# Patient Record
Sex: FEMALE | Race: WHITE | NOT HISPANIC OR LATINO
[De-identification: names, ages, dates, MRNs, and addresses within clinical notes are randomized per-mention and may not be internally consistent; named-entity substitution may affect disease eponyms.]

---

## 2019-05-06 ENCOUNTER — FORM ENCOUNTER (OUTPATIENT)
Age: 69
End: 2019-05-06

## 2020-01-16 ENCOUNTER — FORM ENCOUNTER (OUTPATIENT)
Age: 70
End: 2020-01-16

## 2020-05-10 ENCOUNTER — FORM ENCOUNTER (OUTPATIENT)
Age: 70
End: 2020-05-10

## 2020-07-23 ENCOUNTER — FORM ENCOUNTER (OUTPATIENT)
Age: 70
End: 2020-07-23

## 2020-11-24 ENCOUNTER — FORM ENCOUNTER (OUTPATIENT)
Age: 70
End: 2020-11-24

## 2021-02-09 PROBLEM — Z00.00 ENCOUNTER FOR PREVENTIVE HEALTH EXAMINATION: Status: ACTIVE | Noted: 2021-02-09

## 2021-02-10 ENCOUNTER — APPOINTMENT (OUTPATIENT)
Dept: BREAST CENTER | Facility: CLINIC | Age: 71
End: 2021-02-10
Payer: MEDICARE

## 2021-02-10 ENCOUNTER — TRANSCRIPTION ENCOUNTER (OUTPATIENT)
Age: 71
End: 2021-02-10

## 2021-02-10 VITALS — HEIGHT: 71 IN | WEIGHT: 193 LBS | BODY MASS INDEX: 27.02 KG/M2

## 2021-02-10 DIAGNOSIS — Z86.39 PERSONAL HISTORY OF OTHER ENDOCRINE, NUTRITIONAL AND METABOLIC DISEASE: ICD-10-CM

## 2021-02-10 DIAGNOSIS — Z80.3 FAMILY HISTORY OF MALIGNANT NEOPLASM OF BREAST: ICD-10-CM

## 2021-02-10 DIAGNOSIS — C50.411 MALIGNANT NEOPLASM OF UPPER-OUTER QUADRANT OF RIGHT FEMALE BREAST: ICD-10-CM

## 2021-02-10 DIAGNOSIS — Z78.9 OTHER SPECIFIED HEALTH STATUS: ICD-10-CM

## 2021-02-10 DIAGNOSIS — R59.0 LOCALIZED ENLARGED LYMPH NODES: ICD-10-CM

## 2021-02-10 PROCEDURE — 99214 OFFICE O/P EST MOD 30 MIN: CPT

## 2021-02-10 RX ORDER — ANASTROZOLE 1 MG/1
1 TABLET ORAL
Refills: 0 | Status: ACTIVE | COMMUNITY

## 2021-02-10 RX ORDER — METFORMIN HYDROCHLORIDE 500 MG/1
500 TABLET, COATED ORAL
Refills: 0 | Status: ACTIVE | COMMUNITY

## 2021-02-10 NOTE — REASON FOR VISIT
[Follow-Up: _____] : a [unfilled] follow-up visit [FreeTextEntry1] : Patient comes in now with a history of undergoing a right breast upper outer quadrant partial mastectomy with axillary lymph node dissection performed by Dr. Gonzalez on June 11, 2018.  She had 12 out of 21 positive lymph nodes and had an 8 mm invasive lobular cancer which was ER/RI positive HER-2/joey negative making this a pathological prognostic stage IIIA breast cancer.  She underwent chemotherapy with Dr. Hays and external beam radiation with Dr. Wright and remains on Arimidex.

## 2021-02-10 NOTE — HISTORY OF PRESENT ILLNESS
[FreeTextEntry1] : The patient is a 70-year-old G3, P2 postmenopausal white female of [place] descent.  She took combination hormone replacement for about 10 years after menopause.  She underwent menarche at age 16 and had her first child at age 22.  She has a family history with her mother who had breast cancer at age 50.  The patient underwent a screening mammography and ultrasound in May 2018 and was found to have a 1.1 cm nodule in the right breast 11:00 region 4 cm from the nipple and ultrasound-guided core biopsy showed an invasive lobular cancer which was ER/OH positive HER-2/joey negative with a Ki-67 of 10%.  She underwent a right breast partial mastectomy and sentinel lymph node biopsy by Dr. Gonzalez on June 11, 2018.  Final pathology showed an 8 mm invasive lobular cancer with negative margins but she had 8 out of 9 positive sentinel lymph nodes and underwent an axillary lymph node dissection on June 25, 2018 showing another 4 out of 12 positive nodes making a total of 12 out of 20 1positive lymph nodes with extranodal extension making this a stage IIIA pathologic prognostic breast cancer.  She underwent chemotherapy through Dr. Hays and underwent external beam radiation through Dr. Wright.  She is currently on Arimidex and comes in for routine follow-up.

## 2021-02-10 NOTE — PHYSICAL EXAM
[Normocephalic] : normocephalic [Atraumatic] : atraumatic [EOMI] : extra ocular movement intact [Supple] : supple [No Supraclavicular Adenopathy] : no supraclavicular adenopathy [No Cervical Adenopathy] : no cervical adenopathy [Normal Sinus Rhythm] : normal sinus rhythm [Examined in the supine and seated position] : examined in the supine and seated position [No dominant masses] : no dominant masses in right breast  [No dominant masses] : no dominant masses left breast [No Nipple Retraction] : no left nipple retraction [No Nipple Discharge] : no left nipple discharge [Breast Mass Right Breast ___cm] : no masses [Breast Mass Left Breast ___cm] : no masses [Breast Nipple Inversion] : nipples not inverted [Breast Nipple Retraction] : nipples not retracted [Breast Nipple Flattening] : nipples not flattened [Breast Nipple Fissures] : nipples not fissured [No Axillary Lymphadenopathy] : no left axillary lymphadenopathy [Soft] : abdomen soft [Normal Bowel Sounds] : normal bowel sounds  [No Edema] : no edema [No Rashes] : no rashes [No Ulceration] : no ulceration [de-identified] : The patient has the obvious partial mastectomy with scarring changes in the upper outer aspect of the right breast.  She has no evidence of recurrence and her left breast is negative.  She has no axillary, supraclavicular, or cervical adenopathy. [de-identified] : Scarring changes in the right upper outer quadrant and axillary region from her prior partial mastectomy and lymph node dissection

## 2021-02-10 NOTE — ASSESSMENT
[FreeTextEntry1] : The patient is a 70-year-old G3, P2 postmenopausal white female of [place] descent.  She took combination hormone replacement for about 10 years after menopause.  She underwent menarche at age 16 and had her first child at age 22.  She has a family history with her mother who had breast cancer at age 50.  The patient underwent a screening mammography and ultrasound in May 2018 and was found to have a 1.1 cm nodule in the right breast 11:00 region 4 cm from the nipple and ultrasound-guided core biopsy showed an invasive lobular cancer which was ER/AR positive HER-2/joey negative with a Ki-67 of 10%.  She underwent a right breast partial mastectomy and sentinel lymph node biopsy by Dr. Gonzalez on June 11, 2018.  Final pathology showed an 8 mm invasive lobular cancer with negative margins but she had 8 out of 9 positive sentinel lymph nodes and underwent an axillary lymph node dissection on June 25, 2018 showing another 4 out of 12 positive nodes making a total of 12 out of 20 1 positive lymph nodes with extranodal extension making this a stage IIIA pathologic prognostic breast cancer.  She underwent chemotherapy through Dr. Hays and underwent external beam radiation through Dr. Wirght.  On exam today she is scarring changes in the upper outer aspect of the right breast but no evidence of recurrence.  She underwent her last bilateral mammography and ultrasound on May 11, 2020 just showing the postop changes in the right breast.  She insisted on an interval ultrasound which was performed on November 25, 2020 which showed no suspicious findings and she has stable scarring.  She should continue 6-month follow-up and we will see her again around August 2021 and she will be due for her next bilateral mammography and ultrasound in May 2021.  She now follows up with Dr. Jordan and remains on Arimidex.

## 2021-09-30 DIAGNOSIS — R92.2 INCONCLUSIVE MAMMOGRAM: ICD-10-CM

## 2021-10-05 NOTE — PAST MEDICAL HISTORY
[Postmenopausal] : The patient is postmenopausal [Menarche Age ____] : age at menarche was [unfilled] [History of Hormone Replacement Treatment] : has a history of hormone replacement treatment [Total Preg ___] : G[unfilled] [Live Births ___] : P[unfilled]  [Age At Live Birth ___] : Age at live birth: [unfilled]

## 2021-10-06 ENCOUNTER — APPOINTMENT (OUTPATIENT)
Dept: BREAST CENTER | Facility: CLINIC | Age: 71
End: 2021-10-06
Payer: MEDICARE

## 2021-10-06 DIAGNOSIS — N64.4 MASTODYNIA: ICD-10-CM

## 2021-10-06 PROCEDURE — 99213 OFFICE O/P EST LOW 20 MIN: CPT

## 2021-10-06 RX ORDER — ANASTROZOLE TABLETS 1 MG/1
1 TABLET ORAL DAILY
Qty: 90 | Refills: 0 | Status: ACTIVE | COMMUNITY
Start: 2021-10-06 | End: 1900-01-01

## 2021-10-06 NOTE — ASSESSMENT
[FreeTextEntry1] : The patient is a 71-year-old G3, P2 postmenopausal white female of [place] descent.  She underwent menarche at age 16 and had her first child at age 22.  She took combination hormone replacement therapy for about 10 years after menopause.  She has a family history with her mother had breast cancer at age 50.  The patient underwent a screening mammography and ultrasound in May 2018 and was found to have a 1.1 cm nodule in the right breast 11 o'clock position 4 cm from the nipple and underwent an ultrasound-guided core biopsy which showed an invasive lobular cancer which was ER/AK positive HER-2/joey negative with a Ki-67 of 10%.  She underwent a right breast partial mastectomy and sentinel lymph node biopsy by Dr. Gonzalez on June 11, 2018.  Final pathology showed an 8 mm invasive lobular cancer with negative margins but she had 8 out of 9 positive sentinel lymph nodes and then underwent a completion axillary lymph node dissection on June 25, 2018 showing another 4 out of 12 positive nodes making a total of 12 out of 21 positive axillary lymph nodes with extranodal extension making this a pathologic prognostic stage IIIA breast cancer.  She underwent chemotherapy with Dr. Hays and then underwent external beam radiation with Dr. Wright.  She is currently on Arimidex.  She underwent her last bilateral mammography and ultrasound which I reviewed from [facility] on May 12, 2021 which showed no suspicious findings.  On exam today, I cannot feel any suspicious densities in either breast.  The patient was reassured and should follow-up again in 6 months in April 2022.  She is going to get another diagnostic right breast ultrasound in November 2021 and her next bilateral mammography and ultrasound will be due in May 2022 and she was given prescriptions.  She will remain on Arimidex and continue follow-up with Dr. Jordan.

## 2021-10-06 NOTE — REASON FOR VISIT
[Follow-Up: _____] : a [unfilled] follow-up visit [FreeTextEntry1] : The patient comes in with a family history of breast cancer and a personal history of undergoing a right breast partial mastectomy and sentinel lymph node biopsy by Dr. Gonzalez in June 2018 for an 8 mm invasive lobular cancer with negative margins in the upper outer aspect of the right breast which was ER/UT positive and HER-2/joey negative with a low Ki-67.  She initially had 8 out of 9 positive sentinel lymph nodes and underwent an axillary lymph node dissection as a separate procedure for a total of 12 out of 21 positive lymph nodes with extranodal extension making this a pathologic prognostic stage IIIA breast cancer.  She underwent chemotherapy with Dr. Hays and radiation therapy and was placed on Arimidex and comes in for routine 6-month follow-up.

## 2021-10-06 NOTE — PHYSICAL EXAM
[Normocephalic] : normocephalic [Atraumatic] : atraumatic [EOMI] : extra ocular movement intact [Supple] : supple [No Supraclavicular Adenopathy] : no supraclavicular adenopathy [No Cervical Adenopathy] : no cervical adenopathy [Examined in the supine and seated position] : examined in the supine and seated position [No dominant masses] : no dominant masses in right breast  [No dominant masses] : no dominant masses left breast [No Nipple Retraction] : no left nipple retraction [No Nipple Discharge] : no left nipple discharge [Breast Mass Right Breast ___cm] : no masses [Breast Mass Left Breast ___cm] : no masses [Breast Nipple Inversion] : nipples not inverted [Breast Nipple Retraction] : nipples not retracted [Breast Nipple Flattening] : nipples not flattened [Breast Nipple Fissures] : nipples not fissured [Breast Abnormal Lactation (Galactorrhea)] : no galactorrhea [Breast Abnormal Secretion Bloody Fluid] : no bloody discharge [Breast Abnormal Secretion Serous Fluid] : no serous discharge [Breast Abnormal Secretion Opalescent Fluid] : no milky discharge [No Axillary Lymphadenopathy] : no left axillary lymphadenopathy [No Edema] : no edema [No Rashes] : no rashes [No Ulceration] : no ulceration [de-identified] : On exam, the patient has B-cup breasts.  She has the partial mastectomy incision and scarring changes in the upper outer aspect of the right breast but no evidence of recurrence.  She has no axillary, supraclavicular, or cervical adenopathy. [de-identified] : Status post right breast partial mastectomy with scarring changes but no evidence of recurrence.

## 2021-10-06 NOTE — HISTORY OF PRESENT ILLNESS
[FreeTextEntry1] : The patient is a 71-year-old G3, P2 postmenopausal white female of [place] descent.  She underwent menarche at age 16 and had her first child at age 22.  She took combination hormone replacement therapy for about 10 years after menopause.  She has a family history with her mother had breast cancer at age 50.  The patient underwent a screening mammography and ultrasound in May 2018 and was found to have a 1.1 cm nodule in the right breast 11 o'clock position 4 cm from the nipple and underwent an ultrasound-guided core biopsy which showed an invasive lobular cancer which was ER/MT positive HER-2/joey negative with a Ki-67 of 10%.  She underwent a right breast partial mastectomy and sentinel lymph node biopsy by Dr. Gonzalez on June 11, 2018.  Final pathology showed an 8 mm invasive lobular cancer with negative margins but she had 8 out of 9 positive sentinel lymph nodes and then underwent a completion axillary lymph node dissection on June 25, 2018 showing another 4 out of 12 positive nodes making a total of 12 out of 21 positive axillary lymph nodes with extranodal extension making this a pathologic prognostic stage IIIA breast cancer.  She underwent chemotherapy with Dr. Hays and then underwent external beam radiation with Dr. Wright.  She is currently on Arimidex and comes in for routine follow-up and gets yearly mammography and ultrasound.

## 2022-04-06 ENCOUNTER — APPOINTMENT (OUTPATIENT)
Dept: BREAST CENTER | Facility: CLINIC | Age: 72
End: 2022-04-06
Payer: MEDICARE

## 2022-04-06 VITALS
SYSTOLIC BLOOD PRESSURE: 141 MMHG | HEART RATE: 119 BPM | DIASTOLIC BLOOD PRESSURE: 100 MMHG | BODY MASS INDEX: 25.62 KG/M2 | WEIGHT: 183 LBS | HEIGHT: 71 IN

## 2022-04-06 PROCEDURE — 99213 OFFICE O/P EST LOW 20 MIN: CPT

## 2022-04-06 NOTE — ASSESSMENT
[FreeTextEntry1] : The patient is a 72-year-old G3, P2 postmenopausal white female of [place] descent.  She underwent menarche at age 16 and had her first child at age 22.  She took combination hormone replacement therapy for about 10 years after menopause.  She has a family history with her mother had breast cancer at age 50.  The patient underwent a screening mammography and ultrasound in May 2018 and was found to have a 1.1 cm nodule in the right breast 11 o'clock position 4 cm from the nipple and underwent an ultrasound-guided core biopsy which showed an invasive lobular cancer which was ER/NJ positive HER-2/joey negative with a Ki-67 of 10%.  She underwent a right breast partial mastectomy and sentinel lymph node biopsy by Dr. Gonzalez on June 11, 2018.  Final pathology showed an 8 mm invasive lobular cancer with negative margins but she had 8 out of 9 positive sentinel lymph nodes and then underwent a completion axillary lymph node dissection on June 25, 2018 showing another 4 out of 12 positive nodes making a total of 12 out of 21 positive axillary lymph nodes with extranodal extension making this a pathologic prognostic stage IIIA breast cancer.  She underwent chemotherapy with Dr. Hays and then underwent external beam radiation with Dr. Wright.  She is currently on Arimidex.  She underwent her last bilateral mammography and ultrasound which I reviewed from [facility] on May 12, 2021 which showed no suspicious findings and a follow-up diagnostic right breast ultrasound performed on November 15, 2021 just showed stable postsurgical scarring in the right breast 11:00 region at the site of her partial mastectomy.  On exam today, I cannot feel any suspicious densities in either breast.  The patient was reassured and should follow-up again in 6 months in October 2022.  Her next bilateral mammography and ultrasound will be due in May 2022 and she was given prescriptions.  She will remain on Arimidex and continue follow-up with Dr. Jordan.

## 2022-04-06 NOTE — HISTORY OF PRESENT ILLNESS
[FreeTextEntry1] : The patient is a 72-year-old G3, P2 postmenopausal white female of [place] descent.  She underwent menarche at age 16 and had her first child at age 22.  She took combination hormone replacement therapy for about 10 years after menopause.  She has a family history with her mother had breast cancer at age 50.  The patient underwent a screening mammography and ultrasound in May 2018 and was found to have a 1.1 cm nodule in the right breast 11 o'clock position 4 cm from the nipple and underwent an ultrasound-guided core biopsy which showed an invasive lobular cancer which was ER/NJ positive HER-2/joey negative with a Ki-67 of 10%.  She underwent a right breast partial mastectomy and sentinel lymph node biopsy by Dr. Gonzalez on June 11, 2018.  Final pathology showed an 8 mm invasive lobular cancer with negative margins but she had 8 out of 9 positive sentinel lymph nodes and then underwent a completion axillary lymph node dissection on June 25, 2018 showing another 4 out of 12 positive nodes making a total of 12 out of 21 positive axillary lymph nodes with extranodal extension making this a pathologic prognostic stage IIIA breast cancer.  She underwent chemotherapy with Dr. Hays and then underwent external beam radiation with Dr. Wright.  She is currently on Arimidex and comes in for routine follow-up and gets yearly mammography and ultrasound.

## 2022-04-06 NOTE — REASON FOR VISIT
[Follow-Up: _____] : a [unfilled] follow-up visit [FreeTextEntry1] : The patient comes in with a family history of breast cancer and a personal history of undergoing a right breast partial mastectomy and sentinel lymph node biopsy by Dr. Gonzalez in June 2018 for an 8 mm invasive lobular cancer with negative margins in the upper outer aspect of the right breast which was ER/MO positive and HER-2/joey negative with a low Ki-67.  She initially had 8 out of 9 positive sentinel lymph nodes and underwent an axillary lymph node dissection as a separate procedure for a total of 12 out of 21 positive lymph nodes with extranodal extension making this a pathologic prognostic stage IIIA breast cancer.  She underwent chemotherapy with Dr. Hays and radiation therapy and was placed on Arimidex and comes in for routine 6-month follow-up.

## 2022-04-06 NOTE — PHYSICAL EXAM
[Normocephalic] : normocephalic [Atraumatic] : atraumatic [EOMI] : extra ocular movement intact [Supple] : supple [No Supraclavicular Adenopathy] : no supraclavicular adenopathy [No Cervical Adenopathy] : no cervical adenopathy [Examined in the supine and seated position] : examined in the supine and seated position [No dominant masses] : no dominant masses in right breast  [No dominant masses] : no dominant masses left breast [No Nipple Retraction] : no left nipple retraction [No Nipple Discharge] : no left nipple discharge [Breast Mass Right Breast ___cm] : no masses [Breast Mass Left Breast ___cm] : no masses [No Axillary Lymphadenopathy] : no left axillary lymphadenopathy [No Edema] : no edema [No Rashes] : no rashes [No Ulceration] : no ulceration [Breast Nipple Inversion] : nipples not inverted [Breast Nipple Retraction] : nipples not retracted [Breast Nipple Flattening] : nipples not flattened [Breast Nipple Fissures] : nipples not fissured [Breast Abnormal Lactation (Galactorrhea)] : no galactorrhea [Breast Abnormal Secretion Bloody Fluid] : no bloody discharge [Breast Abnormal Secretion Serous Fluid] : no serous discharge [Breast Abnormal Secretion Opalescent Fluid] : no milky discharge [de-identified] : On exam, the patient has B-cup breasts.  She has the partial mastectomy incision and scarring changes in the upper outer aspect of the right breast but no evidence of recurrence.  She has no axillary, supraclavicular, or cervical adenopathy. [de-identified] : Status post right breast partial mastectomy with scarring changes but no evidence of recurrence.

## 2022-10-04 DIAGNOSIS — R92.8 OTHER ABNORMAL AND INCONCLUSIVE FINDINGS ON DIAGNOSTIC IMAGING OF BREAST: ICD-10-CM

## 2022-10-05 ENCOUNTER — APPOINTMENT (OUTPATIENT)
Dept: BREAST CENTER | Facility: CLINIC | Age: 72
End: 2022-10-05

## 2022-10-05 VITALS — HEIGHT: 71 IN | BODY MASS INDEX: 25.62 KG/M2 | WEIGHT: 183 LBS

## 2022-10-05 PROCEDURE — 99213 OFFICE O/P EST LOW 20 MIN: CPT

## 2022-10-05 NOTE — PHYSICAL EXAM
[Normocephalic] : normocephalic [Atraumatic] : atraumatic [EOMI] : extra ocular movement intact [Supple] : supple [No Supraclavicular Adenopathy] : no supraclavicular adenopathy [No Cervical Adenopathy] : no cervical adenopathy [Examined in the supine and seated position] : examined in the supine and seated position [No dominant masses] : no dominant masses in right breast  [No dominant masses] : no dominant masses left breast [No Nipple Retraction] : no left nipple retraction [No Nipple Discharge] : no left nipple discharge [Breast Mass Right Breast ___cm] : no masses [Breast Mass Left Breast ___cm] : no masses [No Axillary Lymphadenopathy] : no left axillary lymphadenopathy [No Edema] : no edema [No Rashes] : no rashes [No Ulceration] : no ulceration [Breast Nipple Inversion] : nipples not inverted [Breast Nipple Retraction] : nipples not retracted [Breast Nipple Flattening] : nipples not flattened [Breast Nipple Fissures] : nipples not fissured [Breast Abnormal Lactation (Galactorrhea)] : no galactorrhea [Breast Abnormal Secretion Bloody Fluid] : no bloody discharge [Breast Abnormal Secretion Serous Fluid] : no serous discharge [Breast Abnormal Secretion Opalescent Fluid] : no milky discharge [de-identified] : On exam, the patient has B-cup breasts.  She has the partial mastectomy incision and scarring changes in the upper outer aspect of the right breast but no evidence of recurrence.  She has no axillary, supraclavicular, or cervical adenopathy. [de-identified] : Status post right breast partial mastectomy with scarring changes but no evidence of recurrence.

## 2022-10-05 NOTE — ASSESSMENT
[FreeTextEntry1] : The patient is a 72-year-old G3, P2 postmenopausal white female of [place] descent.  She underwent menarche at age 16 and had her first child at age 22.  She took combination hormone replacement therapy for about 10 years after menopause.  She has a family history with her mother who had breast cancer at age 50.  The patient underwent a screening mammography and ultrasound in May 2018 and was found to have a 1.1 cm nodule in the right breast 11 o'clock position 4 cm from the nipple and underwent an ultrasound-guided core biopsy which showed an invasive lobular cancer which was ER/ID positive HER-2/joey negative with a Ki-67 of 10%.  She underwent a right breast partial mastectomy and sentinel lymph node biopsy by Dr. Gonzalez on June 11, 2018.  Final pathology showed an 8 mm invasive lobular cancer with negative margins but she had 8 out of 9 positive sentinel lymph nodes and then underwent a completion axillary lymph node dissection on June 25, 2018 showing another 4 out of 12 positive nodes making a total of 12 out of 21 positive axillary lymph nodes with extranodal extension making this a pathologic prognostic stage IIIA breast cancer.  She underwent chemotherapy with Dr. Hays and then underwent external beam radiation with Dr. Wright.  She is currently on Arimidex.  She underwent her last bilateral mammography and ultrasound which I reviewed from May 16, 2022 performed at [facility] which showed no suspicious findings in the breast but there was some right axillary mammographic architectural distortion for which follow-up right breast mammography was recommended in 6 months.  On exam today, I cannot feel any suspicious densities in either breast.  The patient was reassured and should follow-up again in 6 months in April 2023.  She will be due for follow-up diagnostic right breast/axillary mammography in November 2022 she was given a prescription.  Her next bilateral mammography and ultrasound will be due in May 2023 and she was given prescriptions.  She will remain on Arimidex and continue follow-up with Dr. Jordan.

## 2022-10-05 NOTE — REASON FOR VISIT
[Follow-Up: _____] : a [unfilled] follow-up visit [FreeTextEntry1] : The patient comes in with a family history of breast cancer and a personal history of undergoing a right breast partial mastectomy and sentinel lymph node biopsy by Dr. Gonzalez in June 2018 for an 8 mm invasive lobular cancer with negative margins in the upper outer aspect of the right breast which was ER/DE positive and HER-2/joey negative with a low Ki-67.  She initially had 8 out of 9 positive sentinel lymph nodes and underwent an axillary lymph node dissection as a separate procedure for a total of 12 out of 21 positive lymph nodes with extranodal extension making this a pathologic prognostic stage IIIA breast cancer.  She underwent chemotherapy with Dr. Hays and radiation therapy and was placed on Arimidex and comes in for routine 6-month follow-up.

## 2022-10-05 NOTE — HISTORY OF PRESENT ILLNESS
[FreeTextEntry1] : The patient is a 72-year-old G3, P2 postmenopausal white female of [place] descent.  She underwent menarche at age 16 and had her first child at age 22.  She took combination hormone replacement therapy for about 10 years after menopause.  She has a family history with her who mother had breast cancer at age 50.  The patient underwent a screening mammography and ultrasound in May 2018 and was found to have a 1.1 cm nodule in the right breast 11 o'clock position 4 cm from the nipple and underwent an ultrasound-guided core biopsy which showed an invasive lobular cancer which was ER/AK positive HER-2/joey negative with a Ki-67 of 10%.  She underwent a right breast partial mastectomy and sentinel lymph node biopsy by Dr. Gonzalez on June 11, 2018.  Final pathology showed an 8 mm invasive lobular cancer with negative margins but she had 8 out of 9 positive sentinel lymph nodes and then underwent a completion axillary lymph node dissection on June 25, 2018 showing another 4 out of 12 positive nodes making a total of 12 out of 21 positive axillary lymph nodes with extranodal extension making this a pathologic prognostic stage IIIA breast cancer.  She underwent chemotherapy with Dr. Hays and then underwent external beam radiation with Dr. Wright.  She is currently on Arimidex and comes in for routine follow-up and gets yearly mammography and ultrasound.

## 2022-11-28 ENCOUNTER — RESULT REVIEW (OUTPATIENT)
Age: 72
End: 2022-11-28

## 2023-04-05 ENCOUNTER — APPOINTMENT (OUTPATIENT)
Dept: BREAST CENTER | Facility: CLINIC | Age: 73
End: 2023-04-05
Payer: MEDICARE

## 2023-04-05 PROCEDURE — 99213 OFFICE O/P EST LOW 20 MIN: CPT

## 2023-04-05 NOTE — HISTORY OF PRESENT ILLNESS
[FreeTextEntry1] : The patient is a 73-year-old G3, P2 postmenopausal white female of [place] descent.  She underwent menarche at age 16 and had her first child at age 22.  She took combination hormone replacement therapy for about 10 years after menopause.  She has a family history with her who mother had breast cancer at age 50.  The patient underwent a screening mammography and ultrasound in May 2018 and was found to have a 1.1 cm nodule in the right breast 11 o'clock position 4 cm from the nipple and underwent an ultrasound-guided core biopsy which showed an invasive lobular cancer which was ER/NV positive HER-2/joey negative with a Ki-67 of 10%.  She underwent a right breast partial mastectomy and sentinel lymph node biopsy by Dr. Gonzalez on June 11, 2018.  Final pathology showed an 8 mm invasive lobular cancer with negative margins but she had 8 out of 9 positive sentinel lymph nodes and then underwent a completion axillary lymph node dissection on June 25, 2018 showing another 4 out of 12 positive nodes making a total of 12 out of 21 positive axillary lymph nodes with extranodal extension making this a pathologic prognostic stage IIIA breast cancer.  She underwent chemotherapy with Dr. Hays and then underwent external beam radiation with Dr. Wright.  She is currently on Arimidex and comes in for routine follow-up and gets yearly mammography and ultrasound.

## 2023-04-05 NOTE — PHYSICAL EXAM
[Normocephalic] : normocephalic [Atraumatic] : atraumatic [EOMI] : extra ocular movement intact [Supple] : supple [No Supraclavicular Adenopathy] : no supraclavicular adenopathy [Examined in the supine and seated position] : examined in the supine and seated position [No Cervical Adenopathy] : no cervical adenopathy [No dominant masses] : no dominant masses in right breast  [No dominant masses] : no dominant masses left breast [No Nipple Retraction] : no left nipple retraction [No Nipple Discharge] : no left nipple discharge [Breast Mass Left Breast ___cm] : no masses [Breast Mass Right Breast ___cm] : no masses No [No Axillary Lymphadenopathy] : no left axillary lymphadenopathy [No Edema] : no edema [No Rashes] : no rashes [No Ulceration] : no ulceration [Breast Nipple Inversion] : nipples not inverted [Breast Nipple Retraction] : nipples not retracted [Breast Nipple Flattening] : nipples not flattened [Breast Nipple Fissures] : nipples not fissured [Breast Abnormal Lactation (Galactorrhea)] : no galactorrhea [Breast Abnormal Secretion Bloody Fluid] : no bloody discharge [Breast Abnormal Secretion Serous Fluid] : no serous discharge [Breast Abnormal Secretion Opalescent Fluid] : no milky discharge [de-identified] : On exam, the patient has B-cup breasts.  She has the partial mastectomy incision and scarring changes in the upper outer aspect of the right breast but no evidence of recurrence.  She has no axillary, supraclavicular, or cervical adenopathy. [de-identified] : Status post right breast partial mastectomy with scarring changes but no evidence of recurrence.

## 2023-04-05 NOTE — ASSESSMENT
[FreeTextEntry1] : The patient is a 73-year-old G3, P2 postmenopausal white female of [place] descent.  She underwent menarche at age 16 and had her first child at age 22.  She took combination hormone replacement therapy for about 10 years after menopause.  She has a family history with her mother who had breast cancer at age 50.  The patient underwent a screening mammography and ultrasound in May 2018 and was found to have a 1.1 cm nodule in the right breast 11 o'clock position 4 cm from the nipple and underwent an ultrasound-guided core biopsy which showed an invasive lobular cancer which was ER/CT positive HER-2/joey negative with a Ki-67 of 10%.  She underwent a right breast partial mastectomy and sentinel lymph node biopsy by Dr. Gonzalez on June 11, 2018.  Final pathology showed an 8 mm invasive lobular cancer with negative margins but she had 8 out of 9 positive sentinel lymph nodes and then underwent a completion axillary lymph node dissection on June 25, 2018 showing another 4 out of 12 positive nodes making a total of 12 out of 21 positive axillary lymph nodes with extranodal extension making this a pathologic prognostic stage IIIA breast cancer.  She underwent chemotherapy with Dr. Hays and then underwent external beam radiation with Dr. Wright.  She is currently on Arimidex.  She underwent her last bilateral mammography and ultrasound which I reviewed from May 16, 2022 performed at [facility] which showed no suspicious findings in the breast but there was some right axillary mammographic architectural distortion for which follow-up right breast mammography was performed on November 29, 2022 at [facility] just continuing to show this architectural distortion in the right axilla consistent with scarring.  On exam today, I cannot feel any suspicious densities in either breast.  The patient was reassured and can now follow-up in 1 year in April 2024 since she is now 5 years postop.  She will be due for her next bilateral mammography and ultrasound in May 2023 and she was given prescriptions.  She will remain on Arimidex and continue follow-up with Dr. Mercer at [facility]..

## 2023-04-05 NOTE — REASON FOR VISIT
[Follow-Up: _____] : a [unfilled] follow-up visit [FreeTextEntry1] : The patient comes in with a family history of breast cancer and a personal history of undergoing a right breast partial mastectomy and sentinel lymph node biopsy by Dr. Gonzalez in June 2018 for an 8 mm invasive lobular cancer with negative margins in the upper outer aspect of the right breast which was ER/CO positive and HER-2/joey negative with a low Ki-67.  She initially had 8 out of 9 positive sentinel lymph nodes and underwent an axillary lymph node dissection as a separate procedure for a total of 12 out of 21 positive lymph nodes with extranodal extension making this a pathologic prognostic stage IIIA breast cancer.  She underwent chemotherapy with Dr. Hays and radiation therapy and was placed on Arimidex and comes in for routine 6-month follow-up.

## 2023-09-28 ENCOUNTER — RX RENEWAL (OUTPATIENT)
Age: 73
End: 2023-09-28

## 2023-09-28 RX ORDER — ANASTROZOLE TABLETS 1 MG/1
1 TABLET ORAL DAILY
Qty: 90 | Refills: 2 | Status: ACTIVE | COMMUNITY
Start: 2022-10-05 | End: 1900-01-01

## 2024-04-09 ENCOUNTER — NON-APPOINTMENT (OUTPATIENT)
Age: 74
End: 2024-04-09

## 2024-04-24 NOTE — PHYSICAL EXAM
[Normocephalic] : normocephalic [Atraumatic] : atraumatic [EOMI] : extra ocular movement intact [Supple] : supple [No Supraclavicular Adenopathy] : no supraclavicular adenopathy [No Cervical Adenopathy] : no cervical adenopathy [Examined in the supine and seated position] : examined in the supine and seated position [No dominant masses] : no dominant masses in right breast  [No dominant masses] : no dominant masses left breast [No Nipple Retraction] : no left nipple retraction [No Nipple Discharge] : no left nipple discharge [Breast Mass Right Breast ___cm] : no masses [Breast Mass Left Breast ___cm] : no masses [No Axillary Lymphadenopathy] : no left axillary lymphadenopathy [No Edema] : no edema [No Rashes] : no rashes [No Ulceration] : no ulceration [Breast Nipple Inversion] : nipples not inverted [Breast Nipple Retraction] : nipples not retracted [Breast Nipple Flattening] : nipples not flattened [Breast Nipple Fissures] : nipples not fissured [Breast Abnormal Lactation (Galactorrhea)] : no galactorrhea [Breast Abnormal Secretion Bloody Fluid] : no bloody discharge [Breast Abnormal Secretion Serous Fluid] : no serous discharge [Breast Abnormal Secretion Opalescent Fluid] : no milky discharge [de-identified] : On exam, the patient has B-cup breasts.  She has the partial mastectomy incision and scarring changes in the upper outer aspect of the right breast but no evidence of recurrence.  She has no axillary, supraclavicular, or cervical adenopathy. [de-identified] : Status post right breast partial mastectomy with scarring changes but no evidence of recurrence.

## 2024-04-24 NOTE — REASON FOR VISIT
[Follow-Up: _____] : a [unfilled] follow-up visit [FreeTextEntry1] : The patient comes in with a family history of breast cancer and a personal history of undergoing a right breast partial mastectomy and sentinel lymph node biopsy by Dr. Gonzalez in June 2018 for an 8 mm invasive lobular cancer with negative margins in the upper outer aspect of the right breast which was ER/IL positive and HER-2/joey negative with a low Ki-67.  She initially had 8 out of 9 positive sentinel lymph nodes and underwent an axillary lymph node dissection as a separate procedure for a total of 12 out of 21 positive lymph nodes with extranodal extension making this a pathologic prognostic stage IIIA breast cancer.  She underwent chemotherapy with Dr. Hays and radiation therapy and was placed on Arimidex and comes in for routine 6-month follow-up.

## 2024-04-24 NOTE — HISTORY OF PRESENT ILLNESS
[FreeTextEntry1] : The patient is a 74-year-old G3, P2 postmenopausal white female of [place] descent.  She underwent menarche at age 16 and had her first child at age 22.  She took combination hormone replacement therapy for about 10 years after menopause.  She has a family history with her who mother had breast cancer at age 50.  The patient underwent a screening mammography and ultrasound in May 2018 and was found to have a 1.1 cm nodule in the right breast 11 o'clock position 4 cm from the nipple and underwent an ultrasound-guided core biopsy which showed an invasive lobular cancer which was ER/[place] positive HER-2/joey negative with a Ki-67 of 10%.  She underwent a right breast partial mastectomy and sentinel lymph node biopsy by Dr. Gonzalez on June 11, 2018.  Final pathology showed an 8 mm invasive lobular cancer with negative margins but she had 8 out of 9 positive sentinel lymph nodes and then underwent a completion axillary lymph node dissection on June 25, 2018 showing another 4 out of 12 positive nodes making a total of 12 out of 21 positive axillary lymph nodes with extranodal extension making this a pathologic prognostic stage IIIA breast cancer.  She underwent chemotherapy with Dr. Hays and then underwent external beam radiation with Dr. Wright.  She is currently on Arimidex and comes in for routine follow-up and gets yearly mammography and ultrasound.

## 2024-04-24 NOTE — ASSESSMENT
[FreeTextEntry1] : The patient is a 74-year-old G3, P2 postmenopausal white female of [place] descent.  She underwent menarche at age 16 and had her first child at age 22.  She took combination hormone replacement therapy for about 10 years after menopause.  She has a family history with her mother who had breast cancer at age 50.  The patient underwent a screening mammography and ultrasound in May 2018 and was found to have a 1.1 cm nodule in the right breast 11 o'clock position 4 cm from the nipple and underwent an ultrasound-guided core biopsy which showed an invasive lobular cancer which was ER/NH positive HER-2/joey negative with a Ki-67 of 10%.  She underwent a right breast partial mastectomy and sentinel lymph node biopsy by Dr. Gonzalez on June 11, 2018.  Final pathology showed an 8 mm invasive lobular cancer with negative margins but she had 8 out of 9 positive sentinel lymph nodes and then underwent a completion axillary lymph node dissection on June 25, 2018 showing another 4 out of 12 positive nodes making a total of 12 out of 21 positive axillary lymph nodes with extranodal extension making this a pathologic prognostic stage IIIA breast cancer.  She underwent chemotherapy with Dr. Hays and then underwent external beam radiation with Dr. Wright.  She is currently on Arimidex.  Mammography and ultrasound from May 16, 2022 performed at [facility] showed no suspicious findings in the breast but there was some right axillary mammographic architectural distortion for which follow-up diagnostic right breast mammography performed on November 29, 2022 at [facility] just showed architectural distortion in the right axilla consistent with scarring.  Her last bilateral mammography and ultrasound were reviewed from ??????.   On exam today, I cannot feel any suspicious densities in either breast.  The patient was reassured and can now follow-up in 1 year in April 2025.  She will be due for her next bilateral mammography and ultrasound in ??????? May 2023 and she was given prescriptions.  She will remain on Arimidex and continue follow-up with Dr. Mercer at [facility]..

## 2024-05-01 ENCOUNTER — APPOINTMENT (OUTPATIENT)
Dept: BREAST CENTER | Facility: CLINIC | Age: 74
End: 2024-05-01
Payer: MEDICARE

## 2024-05-01 VITALS
DIASTOLIC BLOOD PRESSURE: 91 MMHG | HEART RATE: 103 BPM | HEIGHT: 71 IN | SYSTOLIC BLOOD PRESSURE: 138 MMHG | BODY MASS INDEX: 27.02 KG/M2 | OXYGEN SATURATION: 97 % | WEIGHT: 193 LBS

## 2024-05-01 DIAGNOSIS — Z90.11 ACQUIRED ABSENCE OF RIGHT BREAST AND NIPPLE: ICD-10-CM

## 2024-05-01 DIAGNOSIS — Z85.3 PERSONAL HISTORY OF MALIGNANT NEOPLASM OF BREAST: ICD-10-CM

## 2024-05-01 DIAGNOSIS — Z12.39 ENCOUNTER FOR OTHER SCREENING FOR MALIGNANT NEOPLASM OF BREAST: ICD-10-CM

## 2024-05-01 DIAGNOSIS — Z80.3 FAMILY HISTORY OF MALIGNANT NEOPLASM OF BREAST: ICD-10-CM

## 2024-05-01 PROCEDURE — 99212 OFFICE O/P EST SF 10 MIN: CPT

## 2024-05-01 RX ORDER — ANASTROZOLE TABLETS 1 MG/1
1 TABLET ORAL DAILY
Qty: 90 | Refills: 2 | Status: ACTIVE | COMMUNITY
Start: 2024-05-01 | End: 1900-01-01

## 2024-05-01 NOTE — REASON FOR VISIT
[Follow-Up: _____] : a [unfilled] follow-up visit [FreeTextEntry1] : The patient comes in with a family history of breast cancer and a personal history of undergoing a right breast partial mastectomy and sentinel lymph node biopsy by Dr. Gonzalez in June 2018 for an 8 mm invasive lobular cancer with negative margins in the upper outer aspect of the right breast which was ER/MI positive and HER-2/joey negative with a low Ki-67.  She initially had 8 out of 9 positive sentinel lymph nodes and underwent an axillary lymph node dissection as a separate procedure for a total of 12 out of 21 positive lymph nodes with extranodal extension making this a pathologic prognostic stage IIIA breast cancer.  She underwent chemotherapy with Dr. Hays and radiation therapy and was placed on Arimidex and comes in for routine 6-month follow-up.

## 2024-05-01 NOTE — HISTORY OF PRESENT ILLNESS
[FreeTextEntry1] : The patient is a 74-year-old G3, P2 postmenopausal white female of [place] descent.  She underwent menarche at age 16 and had her first child at age 22.  She took combination hormone replacement therapy for about 10 years after menopause.  She has a family history with her who mother had breast cancer at age 50.  The patient underwent a screening mammography and ultrasound in May 2018 and was found to have a 1.1 cm nodule in the right breast 11 o'clock position 4 cm from the nipple and underwent an ultrasound-guided core biopsy which showed an invasive lobular cancer which was ER/RI positive HER-2/joey negative with a Ki-67 of 10%.  She underwent a right breast partial mastectomy and sentinel lymph node biopsy by Dr. Gonzalez on June 11, 2018.  Final pathology showed an 8 mm invasive lobular cancer with negative margins but she had 8 out of 9 positive sentinel lymph nodes and then underwent a completion axillary lymph node dissection on June 25, 2018 showing another 4 out of 12 positive nodes making a total of 12 out of 21 positive axillary lymph nodes with extranodal extension making this a pathologic prognostic stage IIIA breast cancer.  She underwent chemotherapy with Dr. Hays and then underwent external beam radiation with Dr. Wright.  She is currently on Arimidex and comes in for routine follow-up and gets yearly mammography and ultrasound.

## 2024-05-01 NOTE — ASSESSMENT
[FreeTextEntry1] : The patient is a 74-year-old G3, P2 postmenopausal white female of [place] descent.  She underwent menarche at age 16 and had her first child at age 22.  She took combination hormone replacement therapy for about 10 years after menopause.  She has a family history with her mother who had breast cancer at age 50.  The patient underwent a screening mammography and ultrasound in May 2018 and was found to have a 1.1 cm nodule in the right breast 11 o'clock position 4 cm from the nipple and underwent an ultrasound-guided core biopsy which showed an invasive lobular cancer which was ER/[place] positive HER-2/joey negative with a Ki-67 of 10%.  She underwent a right breast partial mastectomy and sentinel lymph node biopsy by Dr. Gonzalez on June 11, 2018.  Final pathology showed an 8 mm invasive lobular cancer with negative margins but she had 8 out of 9 positive sentinel lymph nodes and then underwent a completion axillary lymph node dissection on June 25, 2018 showing another 4 out of 12 positive nodes making a total of 12 out of 21 positive axillary lymph nodes with extranodal extension making this a pathologic prognostic stage IIIA breast cancer.  She underwent chemotherapy with Dr. Hays and then underwent external beam radiation with Dr. Wright.  She is currently on Arimidex.  Mammography and ultrasound from May 16, 2022 performed at [facility] showed no suspicious findings in the breast but there was some right axillary mammographic architectural distortion for which follow-up diagnostic right breast mammography performed on November 29, 2022 at [facility] just showed architectural distortion in the right axilla consistent with scarring.  Her last bilateral mammography and ultrasound were reviewed from May 18, 2023 performed at [facility] which showed no suspicious findings with some scarring changes in the right breast 11:00 region. On exam today, I cannot feel any suspicious densities in either breast.  The patient was reassured and can now follow-up in 1 year in May 2025.  She will be due for her next bilateral mammography and ultrasound in May 2024 and she was given prescriptions and she has this scheduled at [facility] for later this month.  She will remain on Arimidex and continue follow-up with Dr. Mercer at [facility]..

## 2024-05-19 ENCOUNTER — RESULT REVIEW (OUTPATIENT)
Age: 74
End: 2024-05-19

## 2025-03-12 ENCOUNTER — NON-APPOINTMENT (OUTPATIENT)
Age: 75
End: 2025-03-12

## 2025-05-19 NOTE — PHYSICAL EXAM
[Normocephalic] : normocephalic [Atraumatic] : atraumatic [EOMI] : extra ocular movement intact [Supple] : supple [No Supraclavicular Adenopathy] : no supraclavicular adenopathy [No Cervical Adenopathy] : no cervical adenopathy [Examined in the supine and seated position] : examined in the supine and seated position [No dominant masses] : no dominant masses in right breast  [No dominant masses] : no dominant masses left breast Price (Use Numbers Only, No Special Characters Or $): 0 [No Nipple Retraction] : no left nipple retraction Anesthesia Volume In Cc: 0.2 [No Nipple Discharge] : no left nipple discharge Consent: The patient's consent was obtained including but not limited to risks of crusting, scabbing, blistering, scarring, darker or lighter pigmentary change, recurrence, incomplete removal and infection. [Breast Mass Right Breast ___cm] : no masses Detail Level: Zone [Breast Mass Left Breast ___cm] : no masses Post-Care Instructions: I reviewed with the patient in detail post-care instructions. Patient is to wear sunprotection, and avoid picking at any of the treated lesions. Pt may apply Vaseline to crusted or scabbing areas. [No Axillary Lymphadenopathy] : no left axillary lymphadenopathy [No Edema] : no edema [No Rashes] : no rashes [No Ulceration] : no ulceration [Breast Nipple Inversion] : nipples not inverted [Breast Nipple Retraction] : nipples not retracted [Breast Nipple Flattening] : nipples not flattened [Breast Nipple Fissures] : nipples not fissured [Breast Abnormal Lactation (Galactorrhea)] : no galactorrhea [Breast Abnormal Secretion Bloody Fluid] : no bloody discharge [Breast Abnormal Secretion Serous Fluid] : no serous discharge [Breast Abnormal Secretion Opalescent Fluid] : no milky discharge [de-identified] : On exam, the patient has B-cup breasts.  She has the partial mastectomy incision and scarring changes in the upper outer aspect of the right breast but no evidence of recurrence.  She has no axillary, supraclavicular, or cervical adenopathy. [de-identified] : Status post right breast partial mastectomy with scarring changes but no evidence of recurrence.

## 2025-05-23 ENCOUNTER — APPOINTMENT (OUTPATIENT)
Dept: BREAST CENTER | Facility: CLINIC | Age: 75
End: 2025-05-23